# Patient Record
Sex: FEMALE | Race: WHITE | URBAN - METROPOLITAN AREA
[De-identification: names, ages, dates, MRNs, and addresses within clinical notes are randomized per-mention and may not be internally consistent; named-entity substitution may affect disease eponyms.]

---

## 2018-01-03 ENCOUNTER — TELEPHONE (OUTPATIENT)
Dept: SLEEP MEDICINE | Age: 28
End: 2018-01-03

## 2018-01-05 ENCOUNTER — TELEPHONE (OUTPATIENT)
Dept: SLEEP MEDICINE | Age: 28
End: 2018-01-05

## 2018-01-05 NOTE — TELEPHONE ENCOUNTER
LM on voicemail that [employer] will be closed this evening. Asked patient to call [employer] on Monday to reschedule study.  Left [employer] phone number

## 2018-02-08 ENCOUNTER — HOSPITAL ENCOUNTER (OUTPATIENT)
Dept: SLEEP MEDICINE | Age: 28
Discharge: HOME | End: 2018-02-08
Attending: PSYCHIATRY & NEUROLOGY
Payer: COMMERCIAL

## 2018-02-08 DIAGNOSIS — J45.909 ASTHMA: ICD-10-CM

## 2018-02-08 DIAGNOSIS — G47.33 OSA (OBSTRUCTIVE SLEEP APNEA): ICD-10-CM

## 2018-02-08 DIAGNOSIS — F41.9 ANXIETY: ICD-10-CM

## 2018-02-08 DIAGNOSIS — F32.A DEPRESSION: ICD-10-CM

## 2018-02-08 PROCEDURE — 95810 POLYSOM 6/> YRS 4/> PARAM: CPT

## 2018-02-09 VITALS — DIASTOLIC BLOOD PRESSURE: 84 MMHG | HEART RATE: 83 BPM | SYSTOLIC BLOOD PRESSURE: 118 MMHG
